# Patient Record
Sex: MALE | Race: BLACK OR AFRICAN AMERICAN | RURAL
[De-identification: names, ages, dates, MRNs, and addresses within clinical notes are randomized per-mention and may not be internally consistent; named-entity substitution may affect disease eponyms.]

---

## 2017-01-16 RX ORDER — FLUOCINONIDE 0.5 MG/G
CREAM TOPICAL
Qty: 30 G | Refills: 0 | Status: SHIPPED | OUTPATIENT
Start: 2017-01-16 | End: 2017-03-23 | Stop reason: SDUPTHER

## 2017-01-17 ENCOUNTER — TELEPHONE (OUTPATIENT)
Dept: FAMILY MEDICINE CLINIC | Age: 32
End: 2017-01-17

## 2017-01-17 NOTE — TELEPHONE ENCOUNTER
Ludmila Marin from [facility] called and asked which steroid cream is Mr. Josi Lopez supposed to be using? Insurance is rejecting cream you sent in yesterday because they say he is already on to many. Pharmacy just wants to confirm what pt is supposed to be using.

## 2017-03-23 ENCOUNTER — TELEPHONE (OUTPATIENT)
Dept: FAMILY MEDICINE CLINIC | Age: 32
End: 2017-03-23

## 2017-03-23 ENCOUNTER — OFFICE VISIT (OUTPATIENT)
Dept: FAMILY MEDICINE CLINIC | Age: 32
End: 2017-03-23

## 2017-03-23 VITALS
DIASTOLIC BLOOD PRESSURE: 77 MMHG | OXYGEN SATURATION: 99 % | SYSTOLIC BLOOD PRESSURE: 137 MMHG | WEIGHT: 194 LBS | HEIGHT: 75 IN | BODY MASS INDEX: 24.12 KG/M2 | RESPIRATION RATE: 18 BRPM | HEART RATE: 81 BPM

## 2017-03-23 DIAGNOSIS — J30.1 SEASONAL ALLERGIC RHINITIS DUE TO POLLEN: ICD-10-CM

## 2017-03-23 DIAGNOSIS — L20.84 INTRINSIC ATOPIC DERMATITIS: Primary | ICD-10-CM

## 2017-03-23 RX ORDER — MOMETASONE FUROATE 1 MG/G
OINTMENT TOPICAL DAILY
Qty: 60 G | Refills: 6 | Status: SHIPPED | OUTPATIENT
Start: 2017-03-23 | End: 2018-04-13 | Stop reason: SDUPTHER

## 2017-03-23 RX ORDER — FLUOCINONIDE 0.5 MG/G
CREAM TOPICAL
Qty: 60 G | Refills: 1 | Status: SHIPPED | OUTPATIENT
Start: 2017-03-23 | End: 2018-04-13 | Stop reason: ALTCHOICE

## 2017-03-23 RX ORDER — FLUTICASONE PROPIONATE 50 MCG
SPRAY, SUSPENSION (ML) NASAL
Qty: 1 BOTTLE | Refills: 5 | Status: SHIPPED | OUTPATIENT
Start: 2017-03-23 | End: 2019-06-18

## 2017-03-23 RX ORDER — AMMONIUM LACTATE 12 G/100G
CREAM TOPICAL 2 TIMES DAILY
Qty: 280 G | Refills: 1 | Status: SHIPPED | OUTPATIENT
Start: 2017-03-23 | End: 2019-06-18 | Stop reason: ALTCHOICE

## 2017-03-23 RX ORDER — BETAMETHASONE VALERATE 1.2 MG/G
OINTMENT TOPICAL DAILY
Qty: 60 G | Refills: 5 | Status: SHIPPED | OUTPATIENT
Start: 2017-03-23 | End: 2018-04-13 | Stop reason: SDUPTHER

## 2017-03-23 RX ORDER — CETIRIZINE HCL 10 MG
10 TABLET ORAL DAILY
Qty: 30 TAB | Refills: 5 | Status: SHIPPED | OUTPATIENT
Start: 2017-03-23 | End: 2018-04-13 | Stop reason: SDUPTHER

## 2017-03-23 RX ORDER — PREDNISONE 20 MG/1
TABLET ORAL
Qty: 60 TAB | Refills: 1 | Status: SHIPPED | OUTPATIENT
Start: 2017-03-23 | End: 2018-04-13 | Stop reason: SDUPTHER

## 2017-03-23 NOTE — MR AVS SNAPSHOT
Visit Information Date & Time Provider Department Dept. Phone Encounter #  
 3/23/2017  3:00 PM Lady Miguel Ángel MD [facility] [phone] [id number] Upcoming Health Maintenance Date Due Pneumococcal 19-64 Medium Risk (1 of 1 - PPSV23) 9/16/2002 DTaP/Tdap/Td series (1 - Tdap) 9/16/2004 INFLUENZA AGE 9 TO ADULT 8/1/2016 Allergies as of 3/23/2017  Review Complete On: 3/23/2017 By: Lady Miguel Ángel MD  
  
 Severity Noted Reaction Type Reactions Corn  08/03/2016    Rash Peanut  08/03/2016    Rash Wheat  08/03/2016    Rash Current Immunizations  Never Reviewed No immunizations on file. Not reviewed this visit Vitals BP Pulse Resp Height(growth percentile) Weight(growth percentile) SpO2  
 137/77 (BP 1 Location: Right arm, BP Patient Position: Sitting) 81 18 6' 3\" (1.905 m) 194 lb (88 kg) 99% BMI Smoking Status 24.25 kg/m2 Current Every Day Smoker Vitals History BMI and BSA Data Body Mass Index Body Surface Area  
 24.25 kg/m 2 2.16 m 2 Preferred Pharmacy Pharmacy Name Phone [facility] [place] [address], [place] - [address] [phone] Your Updated Medication List  
  
   
This list is accurate as of: 3/23/17  3:54 PM.  Always use your most recent med list.  
  
  
  
  
 betamethasone valerate 0.1 % ointment Commonly known as:  Deronda [place] Apply  to affected area daily. fluocinoNIDE 0.05 % topical cream  
Commonly known as:  LIDEX APPLY  CREAM EXTERNALLY TO AFFECTED AREA TWICE DAILY  
  
 halcinonide 0.1 % topical cream  
Commonly known as:  HALOG Apply  to affected area two (2) times a day. mometasone 0.1 % ointment Commonly known as:  Izetta Daily Apply  to affected area daily. predniSONE 10 mg tablet Commonly known as:  Juan Jose Gao Take 1 Tab by mouth daily.   
  
 triamcinolone acetonide 0.1 % topical cream  
 Commonly known as:  KENALOG Apply  to affected area two (2) times a day. use thin layer Introducing [facility] & HEALTH SERVICES! Luis Daniel Toure introduces [employer] patient portal. Now you can access parts of your medical record, email your doctor's office, and request medication refills online. 1. In your internet browser, go to [email or web address] 2. Click on the First Time User? Click Here link in the Sign In box. You will see the New Member Sign Up page. 3. Enter your [employer] Access Code exactly as it appears below. You will not need to use this code after youve completed the sign-up process. If you do not sign up before the expiration date, you must request a new code. · [employer] Access Code: [email or web address] Expires: 6/21/2017  3:54 PM 
 
4. Enter the last four digits of your Social Security Number (xxxx) and Date of Birth (mm/dd/yyyy) as indicated and click Submit. You will be taken to the next sign-up page. 5. Create a [employer] ID. This will be your [employer] login ID and cannot be changed, so think of one that is secure and easy to remember. 6. Create a [employer] password. You can change your password at any time. 7. Enter your Password Reset Question and Answer. This can be used at a later time if you forget your password. 8. Enter your e-mail address. You will receive e-mail notification when new information is available in [address] 9. Click Sign Up. You can now view and download portions of your medical record. 10. Click the Download Summary menu link to download a portable copy of your medical information. If you have questions, please visit the Frequently Asked Questions section of the [employer] website. Remember, [employer] is NOT to be used for urgent needs. For medical emergencies, dial 911. Now available from your iPhone and Android! Please provide this summary of care documentation to your next provider. Your primary care clinician is listed as Danni Hogan. If you have any questions after today's visit, please call [phone].

## 2017-03-23 NOTE — PROGRESS NOTES
Christiano Granados is a 33 y.o. male who presents with the following:  Chief Complaint   Patient presents with    Allergies     patient wants something for allergies    Skin Problem       Allergies   The history is provided by the patient (Patient has moderately severe allergies to pollen especially in the spring and the trees are already blooming and is asking for something to help him get through this). Pertinent negatives include no chest pain, no abdominal pain, no headaches and no shortness of breath. Skin Problem   The history is provided by the patient (The patient has severe atopic dermatitis involving his legs buttocks arms and somewhat to the abdominal torso and in the summer it involves his neck and face). Pertinent negatives include no chest pain, no abdominal pain, no headaches and no shortness of breath. Relieved by: So far physicians have found the only thing that helps him or steroid creams and ointments and prednisone which she has been on for quite some time. Allergies   Allergen Reactions    Corn Rash    Peanut Rash    Wheat Rash       Current Outpatient Prescriptions   Medication Sig    betamethasone valerate (VALISONE) 0.1 % ointment Apply  to affected area daily.  fluocinoNIDE (LIDEX) 0.05 % topical cream APPLY  CREAM EXTERNALLY TO AFFECTED AREA TWICE DAILY    predniSONE (DELTASONE) 20 mg tablet Take 1 tablet every other day by mouth    mometasone (ELOCON) 0.1 % ointment Apply  to affected area daily.  cetirizine (ZYRTEC) 10 mg tablet Take 1 Tab by mouth daily.  fluticasone (FLONASE) 50 mcg/actuation nasal spray 2 puffs each nostril twice daily    ammonium lactate (AMLACTIN) 12 % topical cream Apply  to affected area two (2) times a day. rub in to affected area well    triamcinolone acetonide (KENALOG) 0.1 % topical cream Apply  to affected area two (2) times a day. use thin layer    halcinonide (HALOG) 0.1 % topical cream Apply  to affected area two (2) times a day. No current facility-administered medications for this visit. No past medical history on file. No past surgical history on file. Family History   Problem Relation Age of Onset    No Known Problems Mother     No Known Problems Father        Social History     Social History    Marital status: SINGLE     Spouse name: N/A    Number of children: N/A    Years of education: N/A     Social History Main Topics    Smoking status: Current Every Day Smoker    Smokeless tobacco: Never Used    Alcohol use 1.8 - 3.0 oz/week     3 - 5 Standard drinks or equivalent per week    Drug use: No    Sexual activity: Not Asked     Other Topics Concern    None     Social History Narrative       Review of Systems   Constitutional: Negative for chills, fever, malaise/fatigue and weight loss. HENT: Negative for congestion, hearing loss, sore throat and tinnitus. Eyes: Negative for blurred vision, pain and discharge. Respiratory: Negative for cough, shortness of breath and wheezing. Cardiovascular: Negative for chest pain, palpitations, orthopnea, claudication and leg swelling. Gastrointestinal: Negative for abdominal pain, constipation and heartburn. Genitourinary: Negative for dysuria, frequency and urgency. Musculoskeletal: Negative for falls, joint pain and myalgias. Skin: Positive for itching and rash. Neurological: Negative for dizziness, tingling, tremors and headaches. Endo/Heme/Allergies: Negative for environmental allergies and polydipsia. Psychiatric/Behavioral: Negative for depression and substance abuse. The patient is not nervous/anxious. Visit Vitals    /77 (BP 1 Location: Right arm, BP Patient Position: Sitting)    Pulse 81    Resp 18    Ht 6' 3\" (1.905 m)    Wt 194 lb (88 kg)    SpO2 99%    BMI 24.25 kg/m2     Physical Exam   Constitutional: He is oriented to person, place, and time and well-developed, well-nourished, and in no distress.    HENT:   Head: Normocephalic and atraumatic. Nose: Nose normal.   Mouth/Throat: Oropharynx is clear and moist.   Eyes: Conjunctivae and EOM are normal. Pupils are equal, round, and reactive to light. Neck: Normal range of motion. Neck supple. No JVD present. No tracheal deviation present. No thyromegaly present. Cardiovascular: Normal rate, regular rhythm, normal heart sounds and intact distal pulses. Exam reveals no gallop and no friction rub. No murmur heard. Pulmonary/Chest: Effort normal and breath sounds normal. No respiratory distress. He has no wheezes. He has no rales. He exhibits no tenderness. Abdominal: Soft. Bowel sounds are normal. He exhibits no distension and no mass. There is no tenderness. There is no rebound and no guarding. Musculoskeletal: Normal range of motion. He exhibits no edema or tenderness. Lymphadenopathy:     He has no cervical adenopathy. Neurological: He is alert and oriented to person, place, and time. He has normal reflexes. No cranial nerve deficit. He exhibits normal muscle tone. Gait normal. Coordination normal.   Skin: Skin is warm and dry. Rash noted. No erythema. Patient has severe atopic dermatitis involving most of his legs and almost all of his arms onto the back of his hands and on his abdomen. Psychiatric: Mood, memory, affect and judgment normal.   Vitals reviewed. ICD-10-CM ICD-9-CM    1.  Intrinsic atopic dermatitis L20.84 691.8 betamethasone valerate (VALISONE) 0.1 % ointment      fluocinoNIDE (LIDEX) 0.05 % topical cream      predniSONE (DELTASONE) 20 mg tablet      mometasone (ELOCON) 0.1 % ointment      WI COLLECTION VENOUS BLOOD,VENIPUNCTURE      METABOLIC PANEL, COMPREHENSIVE      CBC WITH AUTOMATED DIFF      cetirizine (ZYRTEC) 10 mg tablet      fluticasone (FLONASE) 50 mcg/actuation nasal spray      ammonium lactate (AMLACTIN) 12 % topical cream      REFERRAL TO DERMATOLOGY   2. Seasonal allergic rhinitis due to pollen J30.1 477.0 betamethasone valerate (VALISONE) 0.1 % ointment      fluocinoNIDE (LIDEX) 0.05 % topical cream      predniSONE (DELTASONE) 20 mg tablet      mometasone (ELOCON) 0.1 % ointment      OR COLLECTION VENOUS BLOOD,VENIPUNCTURE      METABOLIC PANEL, COMPREHENSIVE      CBC WITH AUTOMATED DIFF      cetirizine (ZYRTEC) 10 mg tablet      fluticasone (FLONASE) 50 mcg/actuation nasal spray      ammonium lactate (AMLACTIN) 12 % topical cream      REFERRAL TO DERMATOLOGY       Orders Placed This Encounter    METABOLIC PANEL, COMPREHENSIVE    CBC WITH AUTOMATED DIFF    REFERRAL TO DERMATOLOGY     Referral Priority:   Routine     Referral Type:   Consultation     Referral Reason:   Specialty Services Required     Referred to Provider:   Jan Hogan MD    OR COLLECTION VENOUS BLOOD,VENIPUNCTURE    betamethasone valerate (VALISONE) 0.1 % ointment     Sig: Apply  to affected area daily. Dispense:  60 g     Refill:  5    fluocinoNIDE (LIDEX) 0.05 % topical cream     Sig: APPLY  CREAM EXTERNALLY TO AFFECTED AREA TWICE DAILY     Dispense:  60 g     Refill:  1    predniSONE (DELTASONE) 20 mg tablet     Sig: Take 1 tablet every other day by mouth     Dispense:  60 Tab     Refill:  1    mometasone (ELOCON) 0.1 % ointment     Sig: Apply  to affected area daily. Dispense:  60 g     Refill:  6    cetirizine (ZYRTEC) 10 mg tablet     Sig: Take 1 Tab by mouth daily. Dispense:  30 Tab     Refill:  5    fluticasone (FLONASE) 50 mcg/actuation nasal spray     Si puffs each nostril twice daily     Dispense:  1 Bottle     Refill:  5    ammonium lactate (AMLACTIN) 12 % topical cream     Sig: Apply  to affected area two (2) times a day.  rub in to affected area well     Dispense:  280 g     Refill:  1       Follow-up Disposition: Not on Lincoln Martin MD

## 2017-03-24 ENCOUNTER — DOCUMENTATION ONLY (OUTPATIENT)
Dept: FAMILY MEDICINE CLINIC | Age: 32
End: 2017-03-24

## 2017-03-24 LAB
ALBUMIN SERPL-MCNC: 4.7 G/DL (ref 3.5–5.5)
ALBUMIN/GLOB SERPL: 1.5 {RATIO} (ref 1.2–2.2)
ALP SERPL-CCNC: 68 IU/L (ref 39–117)
ALT SERPL-CCNC: 5 IU/L (ref 0–44)
AST SERPL-CCNC: 12 IU/L (ref 0–40)
BASOPHILS # BLD AUTO: 0 X10E3/UL (ref 0–0.2)
BASOPHILS NFR BLD AUTO: 0 %
BILIRUB SERPL-MCNC: 0.3 MG/DL (ref 0–1.2)
BUN SERPL-MCNC: 8 MG/DL (ref 6–20)
BUN/CREAT SERPL: 9 (ref 8–19)
CALCIUM SERPL-MCNC: 9.8 MG/DL (ref 8.7–10.2)
CHLORIDE SERPL-SCNC: 97 MMOL/L (ref 96–106)
CO2 SERPL-SCNC: 25 MMOL/L (ref 18–29)
CREAT SERPL-MCNC: 0.91 MG/DL (ref 0.76–1.27)
EOSINOPHIL # BLD AUTO: 0.3 X10E3/UL (ref 0–0.4)
EOSINOPHIL NFR BLD AUTO: 4 %
ERYTHROCYTE [DISTWIDTH] IN BLOOD BY AUTOMATED COUNT: 12.8 % (ref 12.3–15.4)
GLOBULIN SER CALC-MCNC: 3.1 G/DL (ref 1.5–4.5)
GLUCOSE SERPL-MCNC: 75 MG/DL (ref 65–99)
HCT VFR BLD AUTO: 46.6 % (ref 37.5–51)
HGB BLD-MCNC: 15.6 G/DL (ref 12.6–17.7)
IMM GRANULOCYTES # BLD: 0 X10E3/UL (ref 0–0.1)
IMM GRANULOCYTES NFR BLD: 0 %
LYMPHOCYTES # BLD AUTO: 1.8 X10E3/UL (ref 0.7–3.1)
LYMPHOCYTES NFR BLD AUTO: 26 %
MCH RBC QN AUTO: 32.4 PG (ref 26.6–33)
MCHC RBC AUTO-ENTMCNC: 33.5 G/DL (ref 31.5–35.7)
MCV RBC AUTO: 97 FL (ref 79–97)
MONOCYTES # BLD AUTO: 0.8 X10E3/UL (ref 0.1–0.9)
MONOCYTES NFR BLD AUTO: 12 %
NEUTROPHILS # BLD AUTO: 4.2 X10E3/UL (ref 1.4–7)
NEUTROPHILS NFR BLD AUTO: 58 %
PLATELET # BLD AUTO: 261 X10E3/UL (ref 150–379)
POTASSIUM SERPL-SCNC: 4.2 MMOL/L (ref 3.5–5.2)
PROT SERPL-MCNC: 7.8 G/DL (ref 6–8.5)
RBC # BLD AUTO: 4.81 X10E6/UL (ref 4.14–5.8)
SODIUM SERPL-SCNC: 140 MMOL/L (ref 134–144)
WBC # BLD AUTO: 7.2 X10E3/UL (ref 3.4–10.8)

## 2017-03-27 NOTE — TELEPHONE ENCOUNTER
Never received fax from ins. Went on covermymeds and completed and faxed the PA on there. Should hear from them within 24-72 hours on decision.

## 2017-12-18 DIAGNOSIS — J30.1 SEASONAL ALLERGIC RHINITIS DUE TO POLLEN, UNSPECIFIED CHRONICITY: ICD-10-CM

## 2017-12-18 DIAGNOSIS — L20.84 INTRINSIC ATOPIC DERMATITIS: ICD-10-CM

## 2017-12-19 RX ORDER — MOMETASONE FUROATE 1 MG/G
OINTMENT TOPICAL DAILY
Qty: 60 G | Refills: 6 | OUTPATIENT
Start: 2017-12-19

## 2018-01-08 DIAGNOSIS — L20.84 INTRINSIC ATOPIC DERMATITIS: ICD-10-CM

## 2018-01-08 DIAGNOSIS — J30.1 CHRONIC SEASONAL ALLERGIC RHINITIS DUE TO POLLEN: ICD-10-CM

## 2018-01-09 RX ORDER — MOMETASONE FUROATE 1 MG/G
OINTMENT TOPICAL DAILY
Qty: 60 G | Refills: 6 | OUTPATIENT
Start: 2018-01-09

## 2018-04-13 ENCOUNTER — OFFICE VISIT (OUTPATIENT)
Dept: FAMILY MEDICINE CLINIC | Age: 33
End: 2018-04-13

## 2018-04-13 VITALS
WEIGHT: 199.4 LBS | OXYGEN SATURATION: 100 % | SYSTOLIC BLOOD PRESSURE: 120 MMHG | RESPIRATION RATE: 17 BRPM | DIASTOLIC BLOOD PRESSURE: 83 MMHG | HEIGHT: 75 IN | HEART RATE: 68 BPM | BODY MASS INDEX: 24.79 KG/M2

## 2018-04-13 DIAGNOSIS — L20.84 INTRINSIC ATOPIC DERMATITIS: ICD-10-CM

## 2018-04-13 DIAGNOSIS — J30.1 SEASONAL ALLERGIC RHINITIS DUE TO POLLEN: ICD-10-CM

## 2018-04-13 RX ORDER — TRIAMCINOLONE ACETONIDE 1 MG/G
CREAM TOPICAL 2 TIMES DAILY
Qty: 15 G | Status: CANCELLED | OUTPATIENT
Start: 2018-04-13

## 2018-04-13 RX ORDER — TRIAMCINOLONE ACETONIDE 40 MG/ML
40 INJECTION, SUSPENSION INTRA-ARTICULAR; INTRAMUSCULAR ONCE
Qty: 1 ML | Refills: 0
Start: 2018-04-13 | End: 2018-04-13

## 2018-04-13 RX ORDER — PREDNISONE 20 MG/1
TABLET ORAL
Qty: 60 TAB | Refills: 1 | Status: SHIPPED | OUTPATIENT
Start: 2018-04-13 | End: 2019-06-18 | Stop reason: SDUPTHER

## 2018-04-13 RX ORDER — MOMETASONE FUROATE 1 MG/G
OINTMENT TOPICAL DAILY
Qty: 90 G | Refills: 5 | Status: SHIPPED | OUTPATIENT
Start: 2018-04-13 | End: 2019-06-18 | Stop reason: SDUPTHER

## 2018-04-13 RX ORDER — BETAMETHASONE VALERATE 1.2 MG/G
OINTMENT TOPICAL DAILY
Qty: 90 G | Refills: 5 | Status: SHIPPED | OUTPATIENT
Start: 2018-04-13 | End: 2019-06-18 | Stop reason: SDUPTHER

## 2018-04-13 RX ORDER — CETIRIZINE HCL 10 MG
10 TABLET ORAL DAILY
Qty: 30 TAB | Refills: 5 | Status: SHIPPED | OUTPATIENT
Start: 2018-04-13 | End: 2019-06-18 | Stop reason: SDUPTHER

## 2018-04-13 NOTE — PROGRESS NOTES
Collin Bolivar is a 27 y.o. male who presents with the following:  No chief complaint on file. Skin Problem   The history is provided by the patient (Patient has severe atopic dermatitis and is out of all of his steroid creams and prednisone and needed to be checked before refills. ). Pertinent negatives include no chest pain, no abdominal pain, no headaches and no shortness of breath. Associated symptoms comments: Patient's skin is thickened and pruritic over large part of his body and on his hands at the knuckles the skin is splitting. .       Allergies   Allergen Reactions    Corn Rash    Peanut Rash    Wheat Rash       Current Outpatient Prescriptions   Medication Sig    betamethasone valerate (VALISONE) 0.1 % ointment Apply  to affected area daily.  predniSONE (DELTASONE) 20 mg tablet Take 1 tablet every other day by mouth    cetirizine (ZYRTEC) 10 mg tablet Take 1 Tab by mouth daily.  mometasone (ELOCON) 0.1 % ointment Apply  to affected area daily.  triamcinolone acetonide (KENALOG) 40 mg/mL injection 1 mL by IntraMUSCular route once for 1 dose.  fluticasone (FLONASE) 50 mcg/actuation nasal spray 2 puffs each nostril twice daily    ammonium lactate (AMLACTIN) 12 % topical cream Apply  to affected area two (2) times a day. rub in to affected area well     No current facility-administered medications for this visit. No past medical history on file. No past surgical history on file.     Family History   Problem Relation Age of Onset    No Known Problems Mother     No Known Problems Father        Social History     Social History    Marital status: SINGLE     Spouse name: N/A    Number of children: N/A    Years of education: N/A     Social History Main Topics    Smoking status: Current Every Day Smoker    Smokeless tobacco: Never Used    Alcohol use 1.8 - 3.0 oz/week     3 - 5 Standard drinks or equivalent per week    Drug use: No    Sexual activity: Not Asked     Other Topics Concern    None     Social History Narrative       Review of Systems   Respiratory: Negative for shortness of breath. Cardiovascular: Negative for chest pain. Gastrointestinal: Negative for abdominal pain. Neurological: Negative for headaches. Visit Vitals    /83 (BP 1 Location: Left arm, BP Patient Position: Sitting)    Pulse 68    Resp 17    Ht 6' 3\" (1.905 m)    Wt 199 lb 6.4 oz (90.4 kg)    SpO2 100%    BMI 24.92 kg/m2     Physical Exam   Constitutional: He is oriented to person, place, and time and well-developed, well-nourished, and in no distress. HENT:   Head: Normocephalic and atraumatic. Nose: Nose normal.   Mouth/Throat: Oropharynx is clear and moist.   Eyes: Conjunctivae and EOM are normal. Pupils are equal, round, and reactive to light. Neck: Normal range of motion. Neck supple. No JVD present. No tracheal deviation present. No thyromegaly present. Cardiovascular: Normal rate, regular rhythm, normal heart sounds and intact distal pulses. Exam reveals no gallop and no friction rub. No murmur heard. Pulmonary/Chest: Effort normal and breath sounds normal. No respiratory distress. He has no wheezes. He has no rales. He exhibits no tenderness. Abdominal: Soft. Bowel sounds are normal. He exhibits no distension and no mass. There is no tenderness. There is no rebound and no guarding. Musculoskeletal: Normal range of motion. He exhibits no edema or tenderness. Lymphadenopathy:     He has no cervical adenopathy. Neurological: He is alert and oriented to person, place, and time. He has normal reflexes. No cranial nerve deficit. He exhibits normal muscle tone. Gait normal. Coordination normal.   Skin: Skin is warm and dry. No rash noted. No erythema. Patient's skin is darkened and thick and uncomfortably pruritic   Psychiatric: Mood, memory, affect and judgment normal.   Vitals reviewed. ICD-10-CM ICD-9-CM    1.  Intrinsic atopic dermatitis L20.84 691.8 betamethasone valerate (VALISONE) 0.1 % ointment      predniSONE (DELTASONE) 20 mg tablet      cetirizine (ZYRTEC) 10 mg tablet      mometasone (ELOCON) 0.1 % ointment      TRIAMCINOLONE ACETONIDE INJ   2. Seasonal allergic rhinitis due to pollen J30.1 477.0 betamethasone valerate (VALISONE) 0.1 % ointment      predniSONE (DELTASONE) 20 mg tablet      cetirizine (ZYRTEC) 10 mg tablet      mometasone (ELOCON) 0.1 % ointment      TRIAMCINOLONE ACETONIDE INJ       Orders Placed This Encounter    TRIAMCINOLONE ACETONIDE INJ     Order Specific Question:   Charge Quantity? Answer:   4    betamethasone valerate (VALISONE) 0.1 % ointment     Sig: Apply  to affected area daily. Dispense:  90 g     Refill:  5    predniSONE (DELTASONE) 20 mg tablet     Sig: Take 1 tablet every other day by mouth     Dispense:  60 Tab     Refill:  1    cetirizine (ZYRTEC) 10 mg tablet     Sig: Take 1 Tab by mouth daily. Dispense:  30 Tab     Refill:  5    mometasone (ELOCON) 0.1 % ointment     Sig: Apply  to affected area daily. Dispense:  90 g     Refill:  5    triamcinolone acetonide (KENALOG) 40 mg/mL injection     Si mL by IntraMUSCular route once for 1 dose.      Dispense:  1 mL     Refill:  0   Patient is to recheck in 6 months or as needed    Follow-up Disposition: Not on Nima Ramsey MD

## 2018-04-13 NOTE — MR AVS SNAPSHOT
[address] [phone] Patient: Omar Tang MRN: [id number] Visit Information Date & Time Provider Department Dept. Phone Encounter #  
 4/13/2018  1:40 PM Gerard Givens  [address] [place] [phone] [id number] Upcoming Health Maintenance Date Due Pneumococcal 19-64 Medium Risk (1 of 1 - PPSV23) 9/16/2002 DTaP/Tdap/Td series (1 - Tdap) 9/16/2004 Influenza Age 5 to Adult 5/31/2020* *Topic was postponed. The date shown is not the original due date. Allergies as of 4/13/2018  Review Complete On: 4/13/2018 By: Magdy Dill LPN Severity Noted Reaction Type Reactions Corn  08/03/2016    Rash Peanut  08/03/2016    Rash Wheat  08/03/2016    Rash Current Immunizations  Never Reviewed No immunizations on file. Not reviewed this visit You Were Diagnosed With   
  
 Codes Comments Intrinsic atopic dermatitis     ICD-10-CM: L20.84 ICD-9-CM: 691.8 Seasonal allergic rhinitis due to pollen     ICD-10-CM: J30.1 ICD-9-CM: 477.0 Vitals BP Pulse Resp Height(growth percentile) Weight(growth percentile) SpO2  
 120/83 (BP 1 Location: Left arm, BP Patient Position: Sitting) 68 17 6' 3\" (1.905 m) 199 lb 6.4 oz (90.4 kg) 100% BMI Smoking Status 24.92 kg/m2 Current Every Day Smoker Vitals History BMI and BSA Data Body Mass Index Body Surface Area 24.92 kg/m 2 2.19 m 2 Preferred Pharmacy Pharmacy Name Phone [address] [phone] Your Updated Medication List  
  
   
This list is accurate as of 4/13/18  2:36 PM.  Always use your most recent med list.  
  
  
  
  
 ammonium lactate 12 % topical cream  
Commonly known as:  AMLACTIN  
 Apply  to affected area two (2) times a day. rub in to affected area well  
  
 betamethasone valerate 0.1 % ointment Commonly known as:  Khan Donato Apply  to affected area daily. cetirizine 10 mg tablet Commonly known as:  ZYRTEC Take 1 Tab by mouth daily. fluocinoNIDE 0.05 % topical cream  
Commonly known as:  LIDEX APPLY  CREAM EXTERNALLY TO AFFECTED AREA TWICE DAILY  
  
 fluticasone 50 mcg/actuation nasal spray Commonly known as:  Genie Jerome 2 puffs each nostril twice daily  
  
 halcinonide 0.1 % topical cream  
Commonly known as:  HALOG Apply  to affected area two (2) times a day. mometasone 0.1 % ointment Commonly known as:  Olga Dust Apply  to affected area daily. predniSONE 20 mg tablet Commonly known as:  William Doheny Take 1 tablet every other day by mouth  
  
 triamcinolone acetonide 0.1 % topical cream  
Commonly known as:  KENALOG Apply  to affected area two (2) times a day. use thin layer Prescriptions Sent to Pharmacy Refills  
 betamethasone valerate (VALISONE) 0.1 % ointment 5 Sig: Apply  to affected area daily. Class: Normal  
 Pharmacy: [address] [facility] [place] [address] [place] Ph #: [phone] Route: Topical  
 predniSONE (DELTASONE) 20 mg tablet 1 Sig: Take 1 tablet every other day by mouth Class: Normal  
 Pharmacy: [address] - [address] [place] [address] Ph #: [phone]  
 cetirizine (ZYRTEC) 10 mg tablet 5 Sig: Take 1 Tab by mouth daily. Class: Normal  
 Pharmacy: [address] [facility], [address] [place] [address] [place] Ph #: [phone] Route: Oral  
 mometasone (ELOCON) 0.1 % ointment 5 Sig: Apply  to affected area daily. Class: Normal  
 Pharmacy: [address] [facility], [address] [place] [address] [address] Ph #: [phone] Route: Topical  
  
Introducing [facility]! [facility] introduces [employer] patient portal. Now you can access parts of your medical record, email your doctor's office, and request medication refills online. 1. In your internet browser, go to [email or web address] 2. Click on the First Time User? Click Here link in the Sign In box. You will see the New Member Sign Up page. 3. Enter your [employer] Access Code exactly as it appears below. You will not need to use this code after youve completed the sign-up process. If you do not sign up before the expiration date, you must request a new code. · [employer] Access Code: [email or web address] Expires: 7/12/2018  2:36 PM 
 
4. Enter the last four digits of your Social Security Number (xxxx) and Date of Birth (mm/dd/yyyy) as indicated and click Submit. You will be taken to the next sign-up page. 5. Create a [employer] ID. This will be your [employer] login ID and cannot be changed, so think of one that is secure and easy to remember. 6. Create a [employer] password. You can change your password at any time. 7. Enter your Password Reset Question and Answer. This can be used at a later time if you forget your password. 8. Enter your e-mail address. You will receive e-mail notification when new information is available in [address] 9. Click Sign Up. You can now view and download portions of your medical record. 10. Click the Download Summary menu link to download a portable copy of your medical information. If you have questions, please visit the Frequently Asked Questions section of the [employer] website. Remember, [employer] is NOT to be used for urgent needs. For medical emergencies, dial 911. Now available from your iPhone and Android! Please provide this summary of care documentation to your next provider. Your primary care clinician is listed as Mckay Higginbotham. If you have any questions after today's visit, please call [phone].

## 2021-08-02 ENCOUNTER — OFFICE VISIT (OUTPATIENT)
Dept: FAMILY MEDICINE CLINIC | Age: 36
End: 2021-08-02
Payer: COMMERCIAL

## 2021-08-02 VITALS
BODY MASS INDEX: 24.62 KG/M2 | RESPIRATION RATE: 18 BRPM | HEIGHT: 75 IN | TEMPERATURE: 98.4 F | SYSTOLIC BLOOD PRESSURE: 128 MMHG | HEART RATE: 82 BPM | WEIGHT: 198 LBS | OXYGEN SATURATION: 100 % | DIASTOLIC BLOOD PRESSURE: 74 MMHG

## 2021-08-02 DIAGNOSIS — K02.9 TOOTH DECAYED: ICD-10-CM

## 2021-08-02 DIAGNOSIS — L20.84 INTRINSIC ATOPIC DERMATITIS: Primary | ICD-10-CM

## 2021-08-02 DIAGNOSIS — K08.89 TOOTH PAIN: ICD-10-CM

## 2021-08-02 PROCEDURE — 96372 THER/PROPH/DIAG INJ SC/IM: CPT | Performed by: NURSE PRACTITIONER

## 2021-08-02 PROCEDURE — 99214 OFFICE O/P EST MOD 30 MIN: CPT | Performed by: NURSE PRACTITIONER

## 2021-08-02 RX ORDER — BETAMETHASONE VALERATE 1.2 MG/G
OINTMENT TOPICAL DAILY
Qty: 90 G | Refills: 5 | Status: SHIPPED | OUTPATIENT
Start: 2021-08-02 | End: 2022-08-26 | Stop reason: SDUPTHER

## 2021-08-02 RX ORDER — PREDNISONE 20 MG/1
TABLET ORAL
Qty: 45 TABLET | Refills: 0 | Status: SHIPPED | OUTPATIENT
Start: 2021-08-02 | End: 2022-09-23 | Stop reason: SDUPTHER

## 2021-08-02 RX ORDER — KETOROLAC TROMETHAMINE 10 MG/1
10 TABLET, FILM COATED ORAL
Qty: 12 TABLET | Refills: 0 | Status: SHIPPED | OUTPATIENT
Start: 2021-08-02 | End: 2022-09-23 | Stop reason: ALTCHOICE

## 2021-08-02 RX ORDER — TRIAMCINOLONE ACETONIDE 40 MG/ML
40 INJECTION, SUSPENSION INTRA-ARTICULAR; INTRAMUSCULAR ONCE
Qty: 1 ML | Refills: 0
Start: 2021-08-02 | End: 2021-08-02

## 2021-08-02 RX ORDER — CETIRIZINE HCL 10 MG
10 TABLET ORAL DAILY
Qty: 30 TABLET | Refills: 5 | Status: SHIPPED | OUTPATIENT
Start: 2021-08-02 | End: 2022-09-23 | Stop reason: SDUPTHER

## 2021-08-02 RX ORDER — MOMETASONE FUROATE 1 MG/G
OINTMENT TOPICAL DAILY
Qty: 90 G | Refills: 5 | Status: SHIPPED | OUTPATIENT
Start: 2021-08-02 | End: 2022-09-23 | Stop reason: SDUPTHER

## 2021-08-02 RX ORDER — TRIAMCINOLONE ACETONIDE 40 MG/ML
40 INJECTION, SUSPENSION INTRA-ARTICULAR; INTRAMUSCULAR ONCE
Status: COMPLETED | OUTPATIENT
Start: 2021-08-02 | End: 2021-08-02

## 2021-08-02 RX ADMIN — TRIAMCINOLONE ACETONIDE 40 MG: 40 INJECTION, SUSPENSION INTRA-ARTICULAR; INTRAMUSCULAR at 16:21

## 2021-08-02 NOTE — PROGRESS NOTES
1. Have you been to the ER, urgent care clinic since your last visit? Hospitalized since your last visit? No    2. Have you seen or consulted any other health care providers outside of the [address] [place] [address] since your last visit? Include any pap smears or colon screening. No       40mg triamcinolone administered in right deltoid. Patient tolerated medication well. AVS provided to patient with medication information. Patient states understanding.

## 2021-08-02 NOTE — PROGRESS NOTES
Subjective:     CC: skin problem (chronic eczema) and dental pain    Melody Sutton is a 38 y.o. male who presents today to follow up for eczema. Has not been seen in over a year. He will get rashes on his arms and legs and scalp that causes a lot of scabbing and itching. He uses hypo-allergenic hygiene products. He is allergic to wheat, cord, and peanuts and finds it hard to avoid foods with these ingredients. Once he saw a dermatologist Dr Paulette Treadwell who prescribed him meds he could not afford. He uses betamethasone cream and mometasone cream to his rashes daily as needed. He also uses a daily moisturizer and takes a daily antihistamine. If his rash gets really bad he will take 20mg of prednisone for a day or so which calms it down, but it upsets his stomach. He would like a Kenalog shot today. He mentions he has an appt in  3 days with his dentist for a tooth ache on the left upper jaw. The whole left side of his face hurts. Pain is so severe he could not go in to work today. He was unable to do anything over the weekend. He denies fever, chills, or sinus pain. OTC ibuprofen not helping. Patient Active Problem List   Diagnosis Code    Seasonal allergic rhinitis due to pollen J30.1    Intrinsic atopic dermatitis L20.84       No past medical history on file. Current Outpatient Medications:     predniSONE (DELTASONE) 20 mg tablet, Take 1 tablet every other day by mouth, Disp: 45 Tab, Rfl: 0    cetirizine (ZYRTEC) 10 mg tablet, Take 1 Tab by mouth daily. , Disp: 30 Tab, Rfl: 5    mometasone (ELOCON) 0.1 % ointment, Apply  to affected area daily. , Disp: 90 g, Rfl: 5    betamethasone valerate (VALISONE) 0.1 % ointment, Apply  to affected area daily. , Disp: 90 g, Rfl: 5    ketoconazole (NIZORAL) 2 % shampoo, Apply to scalp three times per week and leave on for 5 minutes while in shower before rinsing., Disp: 120 mL, Rfl: 1    sulfacetamide (BLEPH-10) 10 % ophthalmic ointment, Apply thin ribbon of ointment on lower left eyelid three times per day x 10 days, Disp: 3.5 g, Rfl: 0    selenium sulfide 2.25 % sham, Use 1-2 times per week PRN, Disp: 180 mL, Rfl: 1    Allergies   Allergen Reactions    Corn Rash    Peanut Rash    Wheat Rash       No past surgical history on file. Social History     Tobacco Use   Smoking Status Current Every Day Smoker   Smokeless Tobacco Never Used       Social History     Socioeconomic History    Marital status: SINGLE     Spouse name: Not on file    Number of children: Not on file    Years of education: Not on file    Highest education level: Not on file   Tobacco Use    Smoking status: Current Every Day Smoker    Smokeless tobacco: Never Used   Substance and Sexual Activity    Alcohol use: Yes     Alcohol/week: 3.0 - 5.0 standard drinks     Types: 3 - 5 Standard drinks or equivalent per week    Drug use: No     Social Determinants of Health     Financial Resource Strain:     Difficulty of Paying Living Expenses:    Food Insecurity:     Worried About Running Out of Food in the Last Year:     [address] in the Last Year:    Transportation Needs:     Lack of Transportation (Medical):      Lack of Transportation (Non-Medical):    Physical Activity:     Days of Exercise per Week:     Minutes of Exercise per Session:    Stress:     Feeling of Stress :    Social Connections:     Frequency of Communication with Friends and Family:     Frequency of Social Gatherings with Friends and Family:     Attends Gnosticism Services:     Active Member of Clubs or Organizations:     Attends Club or Organization Meetings:     Marital Status:        Family History   Problem Relation Age of Onset    No Known Problems Mother     No Known Problems Father        ROS:  Gen: denies fever, chills, or fatigue  HEENT: +upper left tooth pain with H/A, denies nasal congestion, ear pain, or sore throat  Resp: denies dyspnea, cough, or wheezing  CV: denies chest pain, pressure, or palpitations  Extremeties: denies edema  GI[de-identified] denies abdominal pain, nausea, vomiting, diarrhea, or constipation  Musculoskeletal: no joint pain, stiffness, or muscle cramps  Neuro: denies numbness/tingling or dizziness  Skin: +bumpy itchy rash to both arms and legs   Psych: denies anxiety, depression, jo-ann, or other changes in mood    Objective:     Visit Vitals  /74 (BP 1 Location: Left upper arm)   Pulse 82   Temp 98.4 °F (36.9 °C) (Temporal)   Resp 18   Ht 6' 3\" (1.905 m)   Wt 198 lb (89.8 kg)   SpO2 100%   BMI 24.75 kg/m²       General: Alert and oriented. No acute distress. Well nourished. HEENT :  Eyes: Sclera white, conjunctiva clear. PERRLA. Extra ocular movements intact. Neck: Supple with FROM. Lungs: Breathing even and unlabored. All lobes clear to auscultation bilaterally   Heart :RRR, S1 and S2 normal intensity, no extra heart sounds  Extremities: Non-edematous  Musculoskeletal: No joint pain, heat, erythema, or swelling. Neuro: Cranial nerves grossly normal.  Psych: Mood and thought content appropriate for situation. Dressed appropriately and with good hygiene. Skin: Warm and dry, +scattered thin and crusty lesions to both arms. + backs of legs are covered in flesh covered nodules and open sores. Face is clear. Assessment/ Plan:     Eczema, generalized, severe  I am questioning the diagnosis as these lesions do not look consistent with eczema but he states he has been dealing with this for years. I advised him to go back and follow up with Derm and he was given the office info for Dr Juanita Ureña in [place] who took over Dr Snehal Kelley practice. New referral order was placed. Cont steroid creams prn- refilled  Cont daily moisturizer cream  Cont daily antihistamine  May cont prednisone 20mg daily but use sparingly  Kenalog 40mg IM x 1 NOW  -Use mild soap and body wash (Dove) when washing affected area.   -Pat skin dry (do not rub skin with towel) before applying topical medication.  -Avoid scratching skin or wearing irritable clothing over affected area. -Avoid known allergens and aggravating factors. F/U 6-12 months or sooner prn    Tooth ache (decayed tooth)  Upper left lateral incisor is decayed and eroding  Start Toradol 10mg po q 6 hours prn pain- take with food  He was instructed not to take any other NSAIDS while on Toradol  He was told he may combine it with OTC Tylenol  FU with dentist as scheduled in 3 days      Verbal and written instructions (see AVS) provided.  Patient expresses understanding of diagnosis and treatment plan. Health Maintenance Due   Topic Date Due    Hepatitis C Screening  Never done    Pneumococcal 0-64 years (1 of 2 - PPSV23) Never done    COVID-19 Vaccine (1) Never done    DTaP/Tdap/Td series (1 - Tdap) Never done               Binnie Seip. Bertrand James, SHANTHI

## 2021-08-02 NOTE — PATIENT INSTRUCTIONS
Triamcinolone (By injection)   Triamcinolone (trye-am-SIN-oh-lone)  Treats many diseases and conditions, especially problems related to inflammation. May reduce inflammation in joints. This medicine is a corticosteroid. Brand Name(s): Active Injection Kit KL-3, Active Injection Kit KM, Arze-Ject-A, BT Injection Kit, Bupivilog Kit, DermacinRx Cinlone-I CPI, Interarticular Joint Kit, JTT Physicians Kit, Kenalog-10, Kenalog-40, LT Injection Kit, Lidolog Kit, MLK F1 Kit, MLK F2 Kit, MLK F3 Kit   There may be other brand names for this medicine. When This Medicine Should Not Be Used: You should not receive this medicine if you have had an allergic reaction to triamcinolone. You should not receive this medicine if you have fungal infections or a condition called idiopathic thrombocytopenic purpura. This medicine should not be given to premature babies. How to Use This Medicine:   Injectable  · Your doctor will prescribe your exact dose and tell you how often it should be given. This medicine is given as a shot into one of your muscles, a joint, or a spot on your skin called a lesion. · A nurse or other health provider will give you this medicine. · Carefully follow your doctor's instructions about any special diet. If a dose is missed:   · You must use this medicine on a fixed schedule. Call your doctor or pharmacist if you miss a dose. Drugs and Foods to Avoid:   Ask your doctor or pharmacist before using any other medicine, including over-the-counter medicines, vitamins, and herbal products. · There are many other drugs that can interact with triamcinolone. Make sure your doctor knows about all other medicines you are using.   · Make sure your doctor knows if you are using aminoglutethimide (Cytadren®), cholestyramine Ulysess Helling), cyclosporine (Gengraf®, Neoral®, Sandimmune®), digoxin (Lanoxin®), isoniazid (Nydrazid®), ketoconazole (Belen Blackbird), pancuronium (Pavulon®), phenobarbital (Luminal®), phenytoin (Dilantin®), or rifampin (Rifadin®). Tell your doctor if you are using birth control pills, pain or arthritis medicine called NSAIDs (such as aspirin, diclofenac, ibuprofen, naproxen, Advil®, Aleve®, Celebrex®, Ecotrin®, Motrin®, or Voltaren®), or a blood thinner (such as warfarin, Coumadin®). · Make sure your doctor knows if you are using medicine to treat an infection (such as amphotericin B, clarithromycin, erythromycin, troleandomycin, Biaxin®, Bear-tab®, or Zithromax®), a diuretic or \"water pill\" (such as furosemide, hydrochlorothiazide [HCTZ], or Lasix®), or diabetes medicine (such as insulin, glyburide, metformin, Actos®, Avandia®, Glucotrol®,or Glucovance®). · This medicine may interfere with vaccines. Ask your doctor before you get a flu shot or any other vaccines. Warnings While Using This Medicine:   · Make sure your doctor knows if you are pregnant or breastfeeding, or if you have recently spent time in a tropical climate. · Make sure your doctor knows if you have kidney disease, liver disease, diabetes, tuberculosis, stomach or bowel problems, cataracts, glaucoma, or herpes simplex infection in your eyes. Tell your doctor if you have a mental condition, bone problems (such as osteoporosis), myasthenia gravis, or a thyroid disorder. · Make sure your doctor knows if you have heart disease, congestive heart failure, high blood pressure, or a recent heart attack. Tell your doctor if you have certain infections (such as amoebiasis or candidiasis), viral infections, cerebral malaria, or threadworm infestation. · This medicine may cause a serious type of allergic reaction called anaphylaxis. Anaphylaxis can be life-threatening and requires immediate medical attention. You will be observed for signs and symptoms of anaphylaxis after you receive this medicine. Tell your doctor right away about any unusual effects you may have.   · Using too much of this medicine or using it for a long time may increase your risk of having adrenal gland problems. The risk is greater for children and for patients who use large amounts for a long time. Talk to your doctor if you have more than one of these symptoms while you are using this medicine: blurred vision; dizziness or fainting; a fast, pounding, or uneven heartbeat; increased thirst or urination; irritability; or unusual tiredness or weakness. · Let your doctor know if you have any events causing unusual stress or anxiety in your life. Your dose of this medicine may need to be changed. · It may be easier for you to get an infection while you are receiving triamcinolone. Avoid crowded places or being near people who are sick. If you are exposed to chicken pox or measles, tell your doctor right away. · This medicine contains benzyl alcohol that may cause problems (such as low blood pressure and metabolic acidosis) when given too much especially to  babies. · Tell any doctor or dentist who treats you that you are using this medicine. This medicine may affect certain medical test results. · Do not stop using this medicine suddenly. Your doctor will need to slowly decrease your dose before you stop it completely. · Your doctor will check your progress and the effects of this medicine at regular visits. Keep all appointments. Possible Side Effects While Using This Medicine:   Call your doctor right away if you notice any of these side effects:  · Allergic reaction: Itching or hives, swelling in your face or hands, swelling or tingling in your mouth or throat, chest tightness, trouble breathing  · Blurred vision or changes in vision. · Bloody or black, tarry stools. · Change in how much or how often you urinate. · Chest pain or discomfort. · Dry mouth, increased thirst, muscle cramps, nausea, or vomiting. · Fast, slow, pounding, or uneven heartbeat. · Fever, chills, cough, sore throat, and body aches. · Muscle weakness or cramps, or sudden joint pain.   · Numbness or weakness in your arm or leg, or on one side of your body. · Seizures. · Severe headache or pain behind your eyes. · Shortness of breath, cold sweat, or bluish-colored skin. · Slowed growth in children. · Stopping of heart, no blood pressure or pulse, or unconsciousness. · Swelling in your hands, ankles, or feet. · Unusual bleeding, bruising, or weakness. · Vomiting of blood or material that looks like coffee grounds. If you notice these less serious side effects, talk with your doctor:   · Blemishes on the skin or pimples. · Changes in your menstrual periods. · Diarrhea. · Feeling sad or depressed. · Gaining weight around your neck, upper back, breast, face, or waist.  · Mild skin rash. · Mood swings, unusual thoughts or behavior. · Restlessness, anxiety, or increased appetite. · Swelling of abdominal or stomach area, full or bloated feeling, or pressure in the stomach. · Thinning skin, changes in skin color, and increased hair growth. If you notice other side effects that you think are caused by this medicine, tell your doctor. Call your doctor for medical advice about side effects. You may report side effects to FDA at 1-720-FDA-0660  © 2017 [facility] Information is for End User's use only and may not be sold, redistributed or otherwise used for commercial purposes. The above information is an  only. It is not intended as medical advice for individual conditions or treatments. Talk to your doctor, nurse or pharmacist before following any medical regimen to see if it is safe and effective for you.

## 2021-08-04 ENCOUNTER — DOCUMENTATION ONLY (OUTPATIENT)
Dept: FAMILY MEDICINE CLINIC | Age: 36
End: 2021-08-04

## 2021-08-31 ENCOUNTER — TELEPHONE (OUTPATIENT)
Dept: FAMILY MEDICINE CLINIC | Age: 36
End: 2021-08-31

## 2022-03-18 PROBLEM — L20.84 INTRINSIC ATOPIC DERMATITIS: Status: ACTIVE | Noted: 2017-03-23

## 2022-03-18 PROBLEM — J30.1 SEASONAL ALLERGIC RHINITIS DUE TO POLLEN: Status: ACTIVE | Noted: 2017-03-23

## 2022-08-15 DIAGNOSIS — L20.84 INTRINSIC ATOPIC DERMATITIS: ICD-10-CM

## 2022-08-17 RX ORDER — MOMETASONE FUROATE 1 MG/G
OINTMENT TOPICAL
Qty: 45 G | Refills: 0 | OUTPATIENT
Start: 2022-08-17

## 2022-08-17 RX ORDER — BETAMETHASONE VALERATE 1.2 MG/G
OINTMENT TOPICAL
Qty: 90 G | Refills: 0 | OUTPATIENT
Start: 2022-08-17

## 2022-08-25 NOTE — TELEPHONE ENCOUNTER
Pt has an appt with you on 09/22- are you willing to call enough of his cream in to get him through until his appt?

## 2022-08-26 RX ORDER — BETAMETHASONE VALERATE 1.2 MG/G
OINTMENT TOPICAL DAILY
Qty: 45 G | Refills: 0 | Status: SHIPPED | OUTPATIENT
Start: 2022-08-26 | End: 2022-09-23 | Stop reason: SDUPTHER

## 2022-09-23 ENCOUNTER — OFFICE VISIT (OUTPATIENT)
Dept: FAMILY MEDICINE CLINIC | Age: 37
End: 2022-09-23
Payer: COMMERCIAL

## 2022-09-23 VITALS
DIASTOLIC BLOOD PRESSURE: 75 MMHG | RESPIRATION RATE: 20 BRPM | WEIGHT: 203 LBS | TEMPERATURE: 98.4 F | SYSTOLIC BLOOD PRESSURE: 115 MMHG | HEIGHT: 75 IN | HEART RATE: 64 BPM | BODY MASS INDEX: 25.24 KG/M2 | OXYGEN SATURATION: 98 %

## 2022-09-23 DIAGNOSIS — L20.84 INTRINSIC ATOPIC DERMATITIS: Primary | ICD-10-CM

## 2022-09-23 PROCEDURE — 99213 OFFICE O/P EST LOW 20 MIN: CPT | Performed by: NURSE PRACTITIONER

## 2022-09-23 RX ORDER — CETIRIZINE HCL 10 MG
10 TABLET ORAL DAILY
Qty: 30 TABLET | Refills: 5 | Status: SHIPPED | OUTPATIENT
Start: 2022-09-23

## 2022-09-23 RX ORDER — BETAMETHASONE VALERATE 1.2 MG/G
OINTMENT TOPICAL DAILY
Qty: 135 G | Refills: 1 | Status: SHIPPED | OUTPATIENT
Start: 2022-09-23

## 2022-09-23 RX ORDER — PREDNISONE 20 MG/1
TABLET ORAL
Qty: 45 TABLET | Refills: 1 | Status: SHIPPED | OUTPATIENT
Start: 2022-09-23

## 2022-09-23 RX ORDER — MOMETASONE FUROATE 1 MG/G
OINTMENT TOPICAL DAILY
Qty: 135 G | Refills: 1 | Status: SHIPPED | OUTPATIENT
Start: 2022-09-23

## 2022-09-23 NOTE — PROGRESS NOTES
1. \"Have you been to the ER, urgent care clinic since your last visit? Hospitalized since your last visit? \" No    2. \"Have you seen or consulted any other health care providers outside of the [address] [place] [address] since your last visit? \" No     3. For patients aged 39-70: Has the patient had a colonoscopy / FIT/ Cologuard? No     If the patient is female:    4. For patients aged 41-77: Has the patient had a mammogram within the past 2 years? NA - based on age    11. For patients aged 21-65: Has the patient had a pap smear?  NA - based on age

## 2022-09-23 NOTE — PROGRESS NOTES
Subjective:     CC: eczema      Cleveland Murphy is a 42 y.o. male who presents today to follow up for eczema. Has not been seen in over a year. He will get rashes on his arms and legs and scalp that causes a lot of scabbing and itching. He uses hypo-allergenic hygiene products. He is allergic to wheat, cord, and peanuts and finds it hard to avoid foods with these ingredients. Once he saw a dermatologist Dr Alis Barahona who prescribed him meds he could not afford. He uses betamethasone cream and mometasone cream to his rashes daily as needed. He also uses a daily moisturizer and takes a daily antihistamine. If his rash gets really bad he will take 20mg of prednisone for a day or so which calms it down, but it upsets his stomach. He has not wanted to see another dermatologist and still doesn't. He wears long sleeves to cover his rashes. States his grandfather has this same condition. Health maintenance  PNA vaccines- declines  Flu shot- declines   Covid vaccine- declines  No FH of CC  Lipid screening due  Declines blood work    Patient Active Problem List   Diagnosis Code    Seasonal allergic rhinitis due to pollen J30.1    Intrinsic atopic dermatitis L20.84       No past medical history on file. Current Outpatient Medications:     betamethasone valerate (VALISONE) 0.1 % ointment, Apply  to affected area daily. , Disp: 45 g, Rfl: 0    cetirizine (ZYRTEC) 10 mg tablet, Take 1 Tablet by mouth daily. , Disp: 30 Tablet, Rfl: 5    mometasone (ELOCON) 0.1 % ointment, Apply  to affected area daily. , Disp: 90 g, Rfl: 5    predniSONE (DELTASONE) 20 mg tablet, Take 1 tablet every other day by mouth, Disp: 45 Tablet, Rfl: 0    ketorolac (TORADOL) 10 mg tablet, Take 1 Tablet by mouth every six (6) hours as needed for Pain., Disp: 12 Tablet, Rfl: 0    Allergies   Allergen Reactions    Corn Rash    Peanut Rash    Wheat Rash       No past surgical history on file.     Social History     Tobacco Use   Smoking Status Every Day Smokeless Tobacco Never       Social History     Socioeconomic History    Marital status: SINGLE   Tobacco Use    Smoking status: Every Day    Smokeless tobacco: Never   Substance and Sexual Activity    Alcohol use: Yes     Alcohol/week: 3.0 - 5.0 standard drinks     Types: 3 - 5 Standard drinks or equivalent per week    Drug use: No       Family History   Problem Relation Age of Onset    No Known Problems Mother     No Known Problems Father        ROS:  Gen: denies fever, chills, or fatigue  Resp: denies dyspnea, cough, or wheezing  CV: denies chest pain, pressure, or palpitations  Extremeties: denies edema  GI[de-identified] denies abdominal pain, nausea, vomiting, diarrhea, or constipation  Musculoskeletal: no joint pain, stiffness, or muscle cramps  Neuro: denies numbness/tingling or dizziness  Skin: +chronic rash with itching all over body  Psych: denies anxiety, depression, jo-ann, or other changes in mood    Objective:     Visit Vitals  /75 (BP 1 Location: Left arm)   Pulse 64   Temp 98.4 °F (36.9 °C)   Resp 20   Ht 6' 3\" (1.905 m)   Wt 203 lb (92.1 kg)   SpO2 98%   BMI 25.37 kg/m²       General: Alert and oriented. No acute distress. Well nourished. HEENT :  Eyes: Sclera white, conjunctiva clear. PERRLA. Extra ocular movements intact. Neck: Supple with FROM. Lungs: Breathing even and unlabored. All lobes clear to auscultation bilaterally   Heart :RRR, S1 and S2 normal intensity, no extra heart sounds  Extremities: Non-edematous  Musculoskeletal: No joint pain, heat, erythema, or swelling. Neuro: Cranial nerves grossly normal.  Psych: Mood and thought content appropriate for situation. Dressed appropriately and with good hygiene. Skin: Warm and dry, +scattered thick rough patches to both arms and legs. Face is clear.      Assessment/ Plan:     Eczema, generalized, severe  Very poorly controlled  Again I strongly advised him to see a dermatologist for this issue as it is very poorly controlled but he declines   Cont steroid creams prn- refilled  Cont daily moisturizer cream  Cont daily antihistamine  May cont prednisone 20mg daily but use sparingly  -Use mild soap and body wash (Dove) when washing affected area. -Pat skin dry (do not rub skin with towel) before applying topical medication.  -Avoid scratching skin or wearing irritable clothing over affected area. -Avoid known allergens and aggravating factors. F/U 12 months or sooner prn          Verbal and written instructions (see AVS) provided. Patient expresses understanding of diagnosis and treatment plan. Health Maintenance Due   Topic Date Due    Hepatitis C Screening  Never done    COVID-19 Vaccine (1) Never done    Pneumococcal 0-64 years (1 - PCV) Never done    DTaP/Tdap/Td series (1 - Tdap) Never done    Flu Vaccine (1) Never done    Depression Screen  08/02/2022               Patricia Bunn NP

## 2023-09-14 RX ORDER — MOMETASONE FUROATE 1 MG/G
OINTMENT TOPICAL
Qty: 45 G | Refills: 0 | Status: SHIPPED | OUTPATIENT
Start: 2023-09-14

## 2023-09-14 NOTE — TELEPHONE ENCOUNTER
Will give a short supply for now but future refills will require an appt, its been a year since his last appt

## 2023-10-09 NOTE — PROGRESS NOTES
Subjective:     CC: eczema    Amanuel Musa is a 34 y.o. male who presents today for annual follow up for severe eczema. For years he his arms, legs, torso, and scalp have been covered in a rash that causes a lot of scabbing and itching. He uses hypo-allergenic hygiene products. He is allergic to wheat, corn, and peanuts and finds it hard to avoid foods with these ingredients. He is not interested in seeing an allergy specialist for possible allergy shots. He saw a dermatologist Dr Eduardo Condon in the past who prescribed him meds and creams he could not afford so he has not been back. Today I offered to prescribe him dupixant but after reviewing possible side effects he declined. He uses betamethasone cream and mometasone cream to his rashes daily as needed. He also uses a daily moisturizer and takes a daily antihistamine. If his rash gets really bad he will take 20mg of prednisone for a day or so which calms it down, but it upsets his stomach. He wears long sleeves to cover his rashes. States his grandfather has this same condition. He also mentions a tender knot to his right lower cheek that has been there for a year or more. It's size fluctuates. When it swells he is able to squeeze foul smelling drainage out of it. It will then shrink but only temporarily. It will then swell up again. Health maintenance  PNA vaccines- declines  Flu shot- declines   Covid vaccine- declines  No FH of CC  Lipid screening due  Declines blood work    Patient Active Problem List   Diagnosis    Seasonal allergic rhinitis due to pollen    Intrinsic atopic dermatitis       No past medical history on file.       Current Outpatient Medications:     mometasone (ELOCON) 0.1 % ointment, APPLY  OINTMENT TOPICALLY TO AFFECTED AREA ONCE DAILY, Disp: 45 g, Rfl: 0    betamethasone valerate (VALISONE) 0.1 % ointment, APPLY  OINTMENT TO AFFECTED AREA TO AFFECTED AREA ONCE DAILY, Disp: 45 g, Rfl: 0    cetirizine (ZYRTEC)

## 2023-10-11 ENCOUNTER — OFFICE VISIT (OUTPATIENT)
Age: 38
End: 2023-10-11
Payer: COMMERCIAL

## 2023-10-11 VITALS
DIASTOLIC BLOOD PRESSURE: 68 MMHG | HEART RATE: 74 BPM | WEIGHT: 203.25 LBS | RESPIRATION RATE: 18 BRPM | TEMPERATURE: 97.6 F | HEIGHT: 75 IN | BODY MASS INDEX: 25.27 KG/M2 | OXYGEN SATURATION: 97 % | SYSTOLIC BLOOD PRESSURE: 113 MMHG

## 2023-10-11 DIAGNOSIS — L72.3 SEBACEOUS CYST: ICD-10-CM

## 2023-10-11 DIAGNOSIS — L20.84 INTRINSIC ATOPIC DERMATITIS: Primary | ICD-10-CM

## 2023-10-11 PROCEDURE — 99213 OFFICE O/P EST LOW 20 MIN: CPT | Performed by: NURSE PRACTITIONER

## 2023-10-11 PROCEDURE — 96372 THER/PROPH/DIAG INJ SC/IM: CPT | Performed by: NURSE PRACTITIONER

## 2023-10-11 RX ORDER — TRIAMCINOLONE ACETONIDE 40 MG/ML
40 INJECTION, SUSPENSION INTRA-ARTICULAR; INTRAMUSCULAR ONCE
Status: COMPLETED | OUTPATIENT
Start: 2023-10-11 | End: 2023-10-11

## 2023-10-11 RX ORDER — CETIRIZINE HYDROCHLORIDE 10 MG/1
10 TABLET ORAL DAILY
Qty: 90 TABLET | Refills: 3 | Status: SHIPPED | OUTPATIENT
Start: 2023-10-11 | End: 2023-11-10

## 2023-10-11 RX ORDER — CETIRIZINE HYDROCHLORIDE 10 MG/1
10 TABLET ORAL DAILY
Status: CANCELLED | OUTPATIENT
Start: 2023-10-11

## 2023-10-11 RX ORDER — MOMETASONE FUROATE 1 MG/G
OINTMENT TOPICAL
Qty: 45 G | Refills: 2 | Status: SHIPPED | OUTPATIENT
Start: 2023-10-11

## 2023-10-11 RX ADMIN — TRIAMCINOLONE ACETONIDE 40 MG: 40 INJECTION, SUSPENSION INTRA-ARTICULAR; INTRAMUSCULAR at 13:52

## 2023-10-11 SDOH — ECONOMIC STABILITY: HOUSING INSECURITY
IN THE LAST 12 MONTHS, WAS THERE A TIME WHEN YOU DID NOT HAVE A STEADY PLACE TO SLEEP OR SLEPT IN A SHELTER (INCLUDING NOW)?: NO

## 2023-10-11 SDOH — ECONOMIC STABILITY: INCOME INSECURITY: HOW HARD IS IT FOR YOU TO PAY FOR THE VERY BASICS LIKE FOOD, HOUSING, MEDICAL CARE, AND HEATING?: SOMEWHAT HARD

## 2023-10-11 SDOH — ECONOMIC STABILITY: FOOD INSECURITY: WITHIN THE PAST 12 MONTHS, THE FOOD YOU BOUGHT JUST DIDN'T LAST AND YOU DIDN'T HAVE MONEY TO GET MORE.: NEVER TRUE

## 2023-10-11 SDOH — ECONOMIC STABILITY: FOOD INSECURITY: WITHIN THE PAST 12 MONTHS, YOU WORRIED THAT YOUR FOOD WOULD RUN OUT BEFORE YOU GOT MONEY TO BUY MORE.: NEVER TRUE

## 2023-10-11 ASSESSMENT — PATIENT HEALTH QUESTIONNAIRE - PHQ9
SUM OF ALL RESPONSES TO PHQ QUESTIONS 1-9: 0
1. LITTLE INTEREST OR PLEASURE IN DOING THINGS: 0
SUM OF ALL RESPONSES TO PHQ QUESTIONS 1-9: 0
SUM OF ALL RESPONSES TO PHQ QUESTIONS 1-9: 0
SUM OF ALL RESPONSES TO PHQ9 QUESTIONS 1 & 2: 0
2. FEELING DOWN, DEPRESSED OR HOPELESS: 0
SUM OF ALL RESPONSES TO PHQ QUESTIONS 1-9: 0

## 2023-10-11 NOTE — PATIENT INSTRUCTIONS
[address]  What they offer: The [address] [place] [address] Program helps to extend the [facility] by improving the health of our communities with emphasis on people who are poor and under-served. We care for everyone who comes to us in need regardless of his or her ability to pay. At [address] [place] [address], we recognize the difficulties that unexpected medical problems can cause. That's why our [facility] systems have financial assistance programs that are designed to assist you in finding resources that may help pay your hospital bill. Please click on the links below to learn more about the financial assistance programs available within our regions. Phone Number: [phone]  How to apply for the [address] [place] [address] Program:       Option 1: To apply for financial assistance, a patient (or their family or other provider) should fill out the Financial Assistance Application. Copies of the Financial Assistance Application and the FAP may be obtained for free by calling the [address] [place] [address] department at [phone]   Option 2: The Financial Assistance Application and policy may be obtained for free by downloading a copy from the [address] [place] [address] website:  [email or web address]  Applications are available in several languages on the website  [facility]  What they offer: If financial strain is hindering your ability to meet health care needs, the [facility] may be able to help. It provides support to ambulatory patients facing complex health issues and social barriers.  These services are provided at no cost.   Eligible Patients  Adults who are at or below 200% poverty level  Uninsured, underinsured, or those at risk of losing health insurance  You may be eligible to receive:  One-on-one support enrolling in [place], [facility] health care coverage,

## 2023-10-13 ENCOUNTER — CLINICAL DOCUMENTATION (OUTPATIENT)
Age: 38
End: 2023-10-13

## 2023-12-15 ENCOUNTER — OFFICE VISIT (OUTPATIENT)
Age: 38
End: 2023-12-15
Payer: COMMERCIAL

## 2023-12-15 VITALS
BODY MASS INDEX: 25.4 KG/M2 | OXYGEN SATURATION: 97 % | DIASTOLIC BLOOD PRESSURE: 75 MMHG | RESPIRATION RATE: 18 BRPM | HEIGHT: 75 IN | HEART RATE: 87 BPM | TEMPERATURE: 98.9 F | SYSTOLIC BLOOD PRESSURE: 116 MMHG

## 2023-12-15 DIAGNOSIS — D17.22 LIPOMA OF LEFT UPPER EXTREMITY: ICD-10-CM

## 2023-12-15 DIAGNOSIS — J00 ACUTE NASOPHARYNGITIS: Primary | ICD-10-CM

## 2023-12-15 DIAGNOSIS — H61.012: ICD-10-CM

## 2023-12-15 PROBLEM — L30.9 SEVERE ECZEMA: Status: ACTIVE | Noted: 2017-03-23

## 2023-12-15 LAB
EXP DATE SOLUTION: NORMAL
EXP DATE SWAB: NORMAL
EXPIRATION DATE: NORMAL
INFLUENZA A ANTIGEN, POC: NEGATIVE
INFLUENZA B ANTIGEN, POC: NEGATIVE
LOT NUMBER POC: NORMAL
LOT NUMBER SOLUTION: NORMAL
LOT NUMBER SWAB: NORMAL
SARS-COV-2 RNA, POC: NEGATIVE
VALID INTERNAL CONTROL, POC: YES

## 2023-12-15 PROCEDURE — 87502 INFLUENZA DNA AMP PROBE: CPT | Performed by: NURSE PRACTITIONER

## 2023-12-15 PROCEDURE — 87635 SARS-COV-2 COVID-19 AMP PRB: CPT | Performed by: NURSE PRACTITIONER

## 2023-12-15 PROCEDURE — 99214 OFFICE O/P EST MOD 30 MIN: CPT | Performed by: NURSE PRACTITIONER

## 2023-12-15 ASSESSMENT — PATIENT HEALTH QUESTIONNAIRE - PHQ9
2. FEELING DOWN, DEPRESSED OR HOPELESS: 0
SUM OF ALL RESPONSES TO PHQ QUESTIONS 1-9: 0
SUM OF ALL RESPONSES TO PHQ QUESTIONS 1-9: 0
1. LITTLE INTEREST OR PLEASURE IN DOING THINGS: 0
SUM OF ALL RESPONSES TO PHQ QUESTIONS 1-9: 0
SUM OF ALL RESPONSES TO PHQ QUESTIONS 1-9: 0
SUM OF ALL RESPONSES TO PHQ9 QUESTIONS 1 & 2: 0

## 2024-04-27 DIAGNOSIS — L20.84 INTRINSIC ATOPIC DERMATITIS: ICD-10-CM

## 2024-04-29 RX ORDER — MOMETASONE FUROATE 1 MG/G
OINTMENT TOPICAL
Qty: 45 G | Refills: 0 | Status: SHIPPED | OUTPATIENT
Start: 2024-04-29

## 2024-09-09 DIAGNOSIS — L20.84 INTRINSIC ATOPIC DERMATITIS: ICD-10-CM

## 2024-09-09 RX ORDER — MOMETASONE FUROATE 1 MG/G
OINTMENT TOPICAL
Qty: 45 G | Refills: 0 | Status: SHIPPED | OUTPATIENT
Start: 2024-09-09

## 2025-02-25 DIAGNOSIS — L20.84 INTRINSIC ATOPIC DERMATITIS: ICD-10-CM

## 2025-02-26 RX ORDER — MOMETASONE FUROATE 1 MG/G
OINTMENT TOPICAL
Qty: 45 G | Refills: 0 | OUTPATIENT
Start: 2025-02-26

## 2025-02-28 RX ORDER — MOMETASONE FUROATE 1 MG/G
OINTMENT TOPICAL
Qty: 15 G | Refills: 0 | Status: SHIPPED | OUTPATIENT
Start: 2025-02-28 | End: 2025-03-24

## 2025-03-21 NOTE — PROGRESS NOTES
Subjective:     CC: eczema    Harry Moreira is a 39 y.o. male who presents today for a 6 month follow up for severe eczema.        For years his arms, legs, torso, and scalp have been covered in a rash that causes a lot of scabbing and itching. He uses hypo-allergenic hygiene products. He is allergic to wheat, corn, and peanuts and finds it hard to avoid foods with these ingredients. He is not interested in seeing an allergy specialist for possible allergy shots.   He saw a dermatologist Dr Bennett in the past who prescribed him meds and creams he could not afford so he has not been back.     At the last OV I offered to prescribe him dupixant but after reviewing possible side effects he declined. Today he states his cousin just started Dupixent and is doing well on it so he willing to give it a try.     He has tried and failed triamcinolone cream, betamethasone cream, and mometasone cream. He does use a daily moisturizer and takes a daily antihistamine. If his rash gets really bad he will take 20mg of prednisone for a day or so which calms it down, but it upsets his stomach.     He wears long sleeves to cover his rashes.     States his grandfather has this same condition.      Health maintenance  PNA vaccines- declines  Flu shot- declines   Covid vaccine- declines  No FH of CC    Patient Active Problem List   Diagnosis    Seasonal allergic rhinitis due to pollen    Severe eczema    Lipoma of left upper extremity       No past medical history on file.      Current Outpatient Medications:     betamethasone valerate (VALISONE) 0.1 % ointment, APPLY  OINTMENT TO AFFECTED AREA TO AFFECTED AREA ONCE DAILY, Disp: 15 g, Rfl: 0    mometasone (ELOCON) 0.1 % ointment, APPLY  OINTMENT TOPICALLY TO AFFECTED AREA ONCE DAILY, Disp: 15 g, Rfl: 0    Allergies   Allergen Reactions    Corn Oil Rash    Peanut (Diagnostic) Rash    Wheat Rash       No past surgical history on file.    Social History     Tobacco Use   Smoking Status

## 2025-03-24 ENCOUNTER — OFFICE VISIT (OUTPATIENT)
Age: 40
End: 2025-03-24
Payer: COMMERCIAL

## 2025-03-24 VITALS
HEART RATE: 77 BPM | TEMPERATURE: 97.9 F | OXYGEN SATURATION: 97 % | BODY MASS INDEX: 26.11 KG/M2 | HEIGHT: 75 IN | RESPIRATION RATE: 16 BRPM | DIASTOLIC BLOOD PRESSURE: 77 MMHG | SYSTOLIC BLOOD PRESSURE: 119 MMHG | WEIGHT: 210 LBS

## 2025-03-24 DIAGNOSIS — L30.9 SEVERE ECZEMA: Primary | ICD-10-CM

## 2025-03-24 PROCEDURE — 99213 OFFICE O/P EST LOW 20 MIN: CPT | Performed by: NURSE PRACTITIONER

## 2025-03-24 PROCEDURE — 36415 COLL VENOUS BLD VENIPUNCTURE: CPT | Performed by: NURSE PRACTITIONER

## 2025-03-24 RX ORDER — CLOBETASOL PROPIONATE 0.5 MG/G
OINTMENT TOPICAL
Qty: 60 G | Refills: 0 | Status: SHIPPED | OUTPATIENT
Start: 2025-03-24

## 2025-03-24 SDOH — ECONOMIC STABILITY: FOOD INSECURITY: WITHIN THE PAST 12 MONTHS, YOU WORRIED THAT YOUR FOOD WOULD RUN OUT BEFORE YOU GOT MONEY TO BUY MORE.: NEVER TRUE

## 2025-03-24 SDOH — ECONOMIC STABILITY: FOOD INSECURITY: WITHIN THE PAST 12 MONTHS, THE FOOD YOU BOUGHT JUST DIDN'T LAST AND YOU DIDN'T HAVE MONEY TO GET MORE.: NEVER TRUE

## 2025-03-24 ASSESSMENT — PATIENT HEALTH QUESTIONNAIRE - PHQ9
SUM OF ALL RESPONSES TO PHQ QUESTIONS 1-9: 0
1. LITTLE INTEREST OR PLEASURE IN DOING THINGS: NOT AT ALL
2. FEELING DOWN, DEPRESSED OR HOPELESS: NOT AT ALL
SUM OF ALL RESPONSES TO PHQ QUESTIONS 1-9: 0

## 2025-03-24 NOTE — PROGRESS NOTES
Chief Complaint   Patient presents with    Contact Dermatitis       Vitals:    03/24/25 1410   BP: 119/77   Pulse: 77   Resp: 16   Temp: 97.9 °F (36.6 °C)   SpO2: 97%   \"Have you been to the ER, urgent care clinic since your last visit?  Hospitalized since your last visit?\"    NO    “Have you seen or consulted any other health care providers outside our system since your last visit?”    NO

## 2025-03-25 ENCOUNTER — TELEPHONE (OUTPATIENT)
Age: 40
End: 2025-03-25

## 2025-03-25 ENCOUNTER — RESULTS FOLLOW-UP (OUTPATIENT)
Age: 40
End: 2025-03-25

## 2025-03-25 LAB
ALBUMIN SERPL-MCNC: 3.7 G/DL (ref 3.5–5)
ALBUMIN/GLOB SERPL: 1 (ref 1.1–2.2)
ALP SERPL-CCNC: 79 U/L (ref 45–117)
ALT SERPL-CCNC: 13 U/L (ref 12–78)
ANION GAP SERPL CALC-SCNC: 6 MMOL/L (ref 2–12)
AST SERPL-CCNC: 10 U/L (ref 15–37)
BASOPHILS # BLD: 0.03 K/UL (ref 0–0.1)
BASOPHILS NFR BLD: 0.6 % (ref 0–1)
BILIRUB SERPL-MCNC: 0.4 MG/DL (ref 0.2–1)
BUN SERPL-MCNC: 6 MG/DL (ref 6–20)
BUN/CREAT SERPL: 6 (ref 12–20)
CALCIUM SERPL-MCNC: 9.5 MG/DL (ref 8.5–10.1)
CHLORIDE SERPL-SCNC: 105 MMOL/L (ref 97–108)
CO2 SERPL-SCNC: 28 MMOL/L (ref 21–32)
CREAT SERPL-MCNC: 1 MG/DL (ref 0.7–1.3)
DIFFERENTIAL METHOD BLD: NORMAL
EOSINOPHIL # BLD: 0.22 K/UL (ref 0–0.4)
EOSINOPHIL NFR BLD: 4 % (ref 0–7)
ERYTHROCYTE [DISTWIDTH] IN BLOOD BY AUTOMATED COUNT: 11.9 % (ref 11.5–14.5)
GLOBULIN SER CALC-MCNC: 3.7 G/DL (ref 2–4)
GLUCOSE SERPL-MCNC: 90 MG/DL (ref 65–100)
HCT VFR BLD AUTO: 40.3 % (ref 36.6–50.3)
HGB BLD-MCNC: 13.1 G/DL (ref 12.1–17)
IMM GRANULOCYTES # BLD AUTO: 0.01 K/UL (ref 0–0.04)
IMM GRANULOCYTES NFR BLD AUTO: 0.2 % (ref 0–0.5)
LYMPHOCYTES # BLD: 1.72 K/UL (ref 0.8–3.5)
LYMPHOCYTES NFR BLD: 31.6 % (ref 12–49)
MCH RBC QN AUTO: 31.9 PG (ref 26–34)
MCHC RBC AUTO-ENTMCNC: 32.5 G/DL (ref 30–36.5)
MCV RBC AUTO: 98.1 FL (ref 80–99)
MONOCYTES # BLD: 0.58 K/UL (ref 0–1)
MONOCYTES NFR BLD: 10.7 % (ref 5–13)
NEUTS SEG # BLD: 2.88 K/UL (ref 1.8–8)
NEUTS SEG NFR BLD: 52.9 % (ref 32–75)
NRBC # BLD: 0 K/UL (ref 0–0.01)
NRBC BLD-RTO: 0 PER 100 WBC
PLATELET # BLD AUTO: 222 K/UL (ref 150–400)
PMV BLD AUTO: 10.4 FL (ref 8.9–12.9)
POTASSIUM SERPL-SCNC: 3.7 MMOL/L (ref 3.5–5.1)
PROT SERPL-MCNC: 7.4 G/DL (ref 6.4–8.2)
RBC # BLD AUTO: 4.11 M/UL (ref 4.1–5.7)
SODIUM SERPL-SCNC: 139 MMOL/L (ref 136–145)
WBC # BLD AUTO: 5.4 K/UL (ref 4.1–11.1)

## 2025-04-30 ENCOUNTER — OFFICE VISIT (OUTPATIENT)
Age: 40
End: 2025-04-30
Payer: COMMERCIAL

## 2025-04-30 VITALS
HEIGHT: 75 IN | WEIGHT: 209.8 LBS | OXYGEN SATURATION: 98 % | BODY MASS INDEX: 26.08 KG/M2 | RESPIRATION RATE: 18 BRPM | HEART RATE: 100 BPM | DIASTOLIC BLOOD PRESSURE: 72 MMHG | SYSTOLIC BLOOD PRESSURE: 135 MMHG | TEMPERATURE: 98.2 F

## 2025-04-30 DIAGNOSIS — L30.9 SEVERE ECZEMA: Primary | ICD-10-CM

## 2025-04-30 PROCEDURE — 99213 OFFICE O/P EST LOW 20 MIN: CPT | Performed by: NURSE PRACTITIONER

## 2025-04-30 RX ORDER — CLOBETASOL PROPIONATE 0.5 MG/G
OINTMENT TOPICAL
Qty: 60 G | Refills: 2 | Status: SHIPPED | OUTPATIENT
Start: 2025-04-30

## 2025-04-30 RX ORDER — FEXOFENADINE HCL 180 MG/1
180 TABLET ORAL DAILY
Qty: 90 TABLET | Refills: 0 | Status: SHIPPED | OUTPATIENT
Start: 2025-04-30

## 2025-04-30 ASSESSMENT — PATIENT HEALTH QUESTIONNAIRE - PHQ9
SUM OF ALL RESPONSES TO PHQ QUESTIONS 1-9: 0
SUM OF ALL RESPONSES TO PHQ QUESTIONS 1-9: 0
1. LITTLE INTEREST OR PLEASURE IN DOING THINGS: NOT AT ALL
SUM OF ALL RESPONSES TO PHQ QUESTIONS 1-9: 0
2. FEELING DOWN, DEPRESSED OR HOPELESS: NOT AT ALL
SUM OF ALL RESPONSES TO PHQ QUESTIONS 1-9: 0

## 2025-04-30 NOTE — PROGRESS NOTES
\"Have you been to the ER, urgent care clinic since your last visit?  Hospitalized since your last visit?\"    NO    “Have you seen or consulted any other health care providers outside of [facility] since your last visit?”    NO            Click Here for Release of Records Request  
specified.      Evelyne Fairbanks NP    I saw this patient with NP student Alla Mckeon.

## 2025-06-02 DIAGNOSIS — L30.9 SEVERE ECZEMA: ICD-10-CM

## 2025-06-03 RX ORDER — DUPILUMAB 300 MG/2ML
300 INJECTION, SOLUTION SUBCUTANEOUS
Qty: 12 ML | Refills: 0 | Status: SHIPPED | OUTPATIENT
Start: 2025-06-03

## 2025-06-27 ENCOUNTER — OFFICE VISIT (OUTPATIENT)
Age: 40
End: 2025-06-27
Payer: COMMERCIAL

## 2025-06-27 VITALS
SYSTOLIC BLOOD PRESSURE: 123 MMHG | TEMPERATURE: 98.9 F | OXYGEN SATURATION: 96 % | WEIGHT: 211.2 LBS | RESPIRATION RATE: 16 BRPM | HEART RATE: 67 BPM | BODY MASS INDEX: 24.94 KG/M2 | HEIGHT: 77 IN | DIASTOLIC BLOOD PRESSURE: 77 MMHG

## 2025-06-27 DIAGNOSIS — L30.9 SEVERE ECZEMA: Primary | ICD-10-CM

## 2025-06-27 DIAGNOSIS — J30.1 SEASONAL ALLERGIC RHINITIS DUE TO POLLEN: ICD-10-CM

## 2025-06-27 PROCEDURE — 99213 OFFICE O/P EST LOW 20 MIN: CPT | Performed by: NURSE PRACTITIONER

## 2025-06-27 RX ORDER — FEXOFENADINE HCL 180 MG/1
180 TABLET ORAL DAILY
Qty: 90 TABLET | Refills: 1 | Status: SHIPPED | OUTPATIENT
Start: 2025-06-27

## 2025-06-27 RX ORDER — CLOBETASOL PROPIONATE 0.5 MG/G
OINTMENT TOPICAL
Qty: 60 G | Refills: 2 | Status: SHIPPED | OUTPATIENT
Start: 2025-06-27

## 2025-06-27 RX ORDER — MONTELUKAST SODIUM 10 MG/1
10 TABLET ORAL DAILY
Qty: 90 TABLET | Refills: 1 | Status: SHIPPED | OUTPATIENT
Start: 2025-06-27

## 2025-06-27 RX ORDER — DUPILUMAB 300 MG/2ML
300 INJECTION, SOLUTION SUBCUTANEOUS
Qty: 12 ML | Refills: 0 | Status: SHIPPED | OUTPATIENT
Start: 2025-06-27

## 2025-06-27 SDOH — ECONOMIC STABILITY: FOOD INSECURITY: WITHIN THE PAST 12 MONTHS, YOU WORRIED THAT YOUR FOOD WOULD RUN OUT BEFORE YOU GOT MONEY TO BUY MORE.: NEVER TRUE

## 2025-06-27 SDOH — ECONOMIC STABILITY: FOOD INSECURITY: WITHIN THE PAST 12 MONTHS, THE FOOD YOU BOUGHT JUST DIDN'T LAST AND YOU DIDN'T HAVE MONEY TO GET MORE.: NEVER TRUE

## 2025-06-27 ASSESSMENT — PATIENT HEALTH QUESTIONNAIRE - PHQ9
1. LITTLE INTEREST OR PLEASURE IN DOING THINGS: NOT AT ALL
SUM OF ALL RESPONSES TO PHQ QUESTIONS 1-9: 0
SUM OF ALL RESPONSES TO PHQ QUESTIONS 1-9: 0
2. FEELING DOWN, DEPRESSED OR HOPELESS: NOT AT ALL
SUM OF ALL RESPONSES TO PHQ QUESTIONS 1-9: 0
SUM OF ALL RESPONSES TO PHQ QUESTIONS 1-9: 0

## 2025-06-27 NOTE — PROGRESS NOTES
Subjective:     CC: eczema    Harry Moreira is a 39 y.o. male who presents today for a 8 week follow up for severe eczema and environmental/seasonal allergies.        For years his arms, legs, torso, and scalp have been covered in a rash that causes a lot of scabbing and itching. He uses hypo-allergenic hygiene products. He is allergic to wheat, corn, and peanuts and finds it hard to avoid foods with these ingredients. He is not interested in seeing an allergy specialist for possible allergy shots.   He saw a dermatologist Dr Bennett in the past who prescribed him meds and creams he could not afford so he has not been back.     He has tried and failed triamcinolone cream, betamethasone cream, and mometasone cream. He does use a daily moisturizer and takes a daily antihistamine. If his rash gets really bad he will take 20mg of prednisone for a day or so which calms it down, but it upsets his stomach.     He wears long sleeves to cover his rashes.     States his grandfather has this same condition.     At his visit in March he finally agreed to try dupixant and has been injecting 300mg q 2 weeks. His skin is improving. There is some scarring present on his arms and legs but overall the skin is much smoother now.    Seasonal and environmental allergies  He is taking Allegra 180mg daily as needed for nasal drainage and itchy/watery eyes.  Sometimes it will wear off in the afternoons and he will take another dose.  I informed him that 180 mg is the maximum dose and if he feels the need to take anything else he can take Singulair 10 mg daily.  Will send prescription over today.  Of note, prior to this he took Zyrtec for years.          Health maintenance  PNA vaccines- declines  Flu shot- declines   Covid vaccine- declines  No FH of CC    Patient Active Problem List   Diagnosis    Seasonal allergic rhinitis due to pollen    Severe eczema    Lipoma of left upper extremity       No past medical history on

## 2025-06-27 NOTE — PROGRESS NOTES
Vitals:    06/27/25 1511   BP: 123/77   Pulse: 67   Resp: 16   Temp: 98.9 °F (37.2 °C)   SpO2: 96%     Have you been to the ER, urgent care clinic since your last visit?  Hospitalized since your last visit?   NO    Have you seen or consulted any other health care providers outside our system since your last visit?   NO